# Patient Record
Sex: FEMALE | Race: WHITE | URBAN - METROPOLITAN AREA
[De-identification: names, ages, dates, MRNs, and addresses within clinical notes are randomized per-mention and may not be internally consistent; named-entity substitution may affect disease eponyms.]

---

## 2019-04-02 ENCOUNTER — HISTORICAL (OUTPATIENT)
Dept: LAB | Facility: HOSPITAL | Age: 39
End: 2019-04-02

## 2019-04-02 ENCOUNTER — HISTORICAL (OUTPATIENT)
Dept: ADMINISTRATIVE | Facility: HOSPITAL | Age: 39
End: 2019-04-02

## 2019-04-02 LAB
APPEARANCE, UA: ABNORMAL
BACTERIA #/AREA URNS AUTO: ABNORMAL /HPF
BILIRUB UR QL STRIP: ABNORMAL MG/DL
BUN SERPL-MCNC: 9 MG/DL (ref 7–18)
CALCIUM SERPL-MCNC: 9.3 MG/DL (ref 8.5–10)
CHLORIDE SERPL-SCNC: 103 MMOL/L (ref 98–107)
CO2 SERPL-SCNC: 22 MMOL/L (ref 21–32)
COLOR UR: YELLOW
CREAT SERPL-MCNC: 0.79 MG/DL (ref 0.6–1.3)
CREAT/UREA NIT SERPL: 11.4
GLUCOSE (UA): NEGATIVE MG/DL
GLUCOSE SERPL-MCNC: 95 MG/DL (ref 74–106)
HGB UR QL STRIP: ABNORMAL UNIT/L
KETONES UR QL STRIP: ABNORMAL MG/DL
LEUKOCYTE ESTERASE UR QL STRIP: NEGATIVE UNIT/L
NITRITE UR QL STRIP.AUTO: NEGATIVE
PH UR STRIP: 5.5 [PH]
POTASSIUM SERPL-SCNC: 4.3 MMOL/L (ref 3.5–5.1)
PROT UR QL STRIP: NEGATIVE MG/DL
RBC #/AREA URNS HPF: ABNORMAL /HPF
SODIUM SERPL-SCNC: 135 MMOL/L (ref 136–145)
SP GR UR STRIP: 1.02
SQUAMOUS EPITHELIAL, UA: ABNORMAL /LPF
UROBILINOGEN UR STRIP-ACNC: 0.2 MG/DL
WBC #/AREA URNS AUTO: ABNORMAL /[HPF]

## 2020-11-16 ENCOUNTER — HISTORICAL (OUTPATIENT)
Dept: LAB | Facility: HOSPITAL | Age: 40
End: 2020-11-16

## 2022-04-09 ENCOUNTER — HISTORICAL (OUTPATIENT)
Dept: ADMINISTRATIVE | Facility: HOSPITAL | Age: 42
End: 2022-04-09

## 2022-04-26 VITALS
WEIGHT: 194.88 LBS | HEIGHT: 60 IN | DIASTOLIC BLOOD PRESSURE: 90 MMHG | SYSTOLIC BLOOD PRESSURE: 130 MMHG | BODY MASS INDEX: 38.26 KG/M2

## 2022-05-02 NOTE — HISTORICAL [facility] CERNER
This is a historical note converted from Peggy. Formatting and pictures may have been removed.  Please reference Peggy for original formatting and attached multimedia. Chief Complaint  C/O URINARY FREQUENCY, WITH A FOUL ODOR X 1 MTH. NOTICED BLOOD IN URINE LAST WEEK. LMP 3/18/19  History of Present Illness  For 1 month has noted?increase?urinary frequency.? No urgency.? Nocturia x1?which is unusual for her.? Also on 2 occasions she is noted?gross hematuria.? She said quite a bit of blood noted in the commode.? This is not associated with her menses.? No dysuria.? No flank pain.? No fever or chills.? No past history of kidney stones.? No family history of kidney stones.? No other bleeding?or bruising noted.? No NSAIDs.  Review of Systems  HEENT -?? eye exam  CHEST? - Tobacco use  C/V - no CP  GI? - colonoscope   - nocturia  M/S - Dr. MITCH Garza - ?right ankle fracture  GYN - Dr. Alex  ?  Physical Exam  Vitals & Measurements  T:?37.0? ?C (Oral)? HR:?102(Apical)? BP:?150/100?  HT:?152.4?cm? WT:?83.6?kg? BMI:?35.99?  38-year-old white female. ?No acute distress. ?Well-developed. ?Well-nourished.  Rbemy-mtloyrblz-ze masses-no rash.  Abdomen-soft?and nontender. ?No masses. ?No suprapubic tenderness.  Urinalysis?essentially normal. ?No WBCs.??0-3 RBCs.  Blood pressure?150/84.  Assessment/Plan  1.?History of gross hematuria  ?We will do a urine CNS.? We will do?Chem ?7.? If all negative will?refer to urologist.  Ordered:  Basic Metabolic Panel, Routine collect, 04/02/19 16:49:00 CDT, Blood, Stop date 04/02/19 16:49:00 CDT, Lab Collect, History of gross hematuria, 04/02/19 16:49:00 CDT  Office/Outpatient Visit Level 4 Established [id number] PC, History of gross hematuria  Elevated blood pressure reading  Urinary frequency, [facility] - AFP, 04/02/19 16:48:00 CDT  Urine Culture [id number], Routine collect, 04/02/19 16:48:00 CDT, Order for future visit, Urine, Stop date 04/02/19 16:48:00 CDT, History of gross  hematuria  ?  2.?Elevated blood pressure reading  Will purchase home arm blood pressure cuff?and monitor blood pressures daily?for 2 weeks and then send in?readings.? Advised her very important to determine if she does have high blood pressure?and treat if necessary.  Ordered:  Office/Outpatient Visit Level 4 Established [id number] PC, History of gross hematuria  Elevated blood pressure reading  Urinary frequency, [facility] - AFP, 04/02/19 16:48:00 CDT  ?   Problem List/Past Medical History  Ongoing  Obesity  Tobacco use  Historical  No qualifying data  Procedure/Surgical History  Right Ankle Fracture   Medications  escitalopram 20 mg oral tablet, 20 mg= 1 tab(s), Oral, Daily  Allergies  penicillins  Social History  Alcohol - High Risk, 03/14/2014  Current, Wine, Daily, 04/02/2019  Employment/School  Employed, Work/School description: [employer] 04/02/2019  Home/Environment  Lives with Alone., 04/02/2019  Tobacco - High Risk, 03/14/2014  4 or less cigarettes(less than 1/4 pack)/day in last 30 days, No, 04/02/2019  Family History  Father: History is negative  Brother: History is negative  Mother: History is negative  Immunizations  Vaccine Date Status   tetanus/diphth/pertuss (Tdap) adult/adol 05/06/2006 Recorded   Health Maintenance  Health Maintenance  ???Pending?(in the next year)  ??? ??OverDue  ??? ? ? ?Tetanus Vaccine due??05/06/16??and every 10??year(s)  ??? ??Due?  ??? ? ? ?ADL Screening due??04/02/19??and every 1??year(s)  ??? ? ? ?Alcohol Misuse Screening due??04/02/19??and every 1??year(s)  ??? ? ? ?Smoking Cessation due??04/02/19??Variable frequency  ???Satisfied?(in the past 1 year)  ??? ??Satisfied?  ??? ? ? ?Blood Pressure Screening on??04/02/19.??Satisfied by Paloma Lo LPN  ??? ? ? ?Body Mass Index Check on??04/02/19.??Satisfied by Paloma Lo LPN  ??? ? ? ?Obesity Screening on??04/02/19.??Satisfied by Paloma Lo LPN  ?  ?      April 2, 2019:?Urine culture and sensitivity-E. coli.? Treat  with Bactrim DS?for 1 week?if symptoms resolve?if nothing else needs to be done.? If symptoms recur or do not resolve?will need to repeat urinalysis?and possibly see urologist.